# Patient Record
Sex: MALE | Race: OTHER | NOT HISPANIC OR LATINO | URBAN - METROPOLITAN AREA
[De-identification: names, ages, dates, MRNs, and addresses within clinical notes are randomized per-mention and may not be internally consistent; named-entity substitution may affect disease eponyms.]

---

## 2018-07-21 ENCOUNTER — EMERGENCY (EMERGENCY)
Facility: HOSPITAL | Age: 38
LOS: 0 days | Discharge: HOME | End: 2018-07-22
Admitting: PHYSICIAN ASSISTANT

## 2018-07-21 VITALS
HEART RATE: 66 BPM | RESPIRATION RATE: 18 BRPM | OXYGEN SATURATION: 100 % | SYSTOLIC BLOOD PRESSURE: 149 MMHG | DIASTOLIC BLOOD PRESSURE: 88 MMHG | TEMPERATURE: 98 F

## 2018-07-21 VITALS — WEIGHT: 191.8 LBS | HEIGHT: 70 IN

## 2018-07-21 DIAGNOSIS — S61.210A LACERATION WITHOUT FOREIGN BODY OF RIGHT INDEX FINGER WITHOUT DAMAGE TO NAIL, INITIAL ENCOUNTER: ICD-10-CM
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
## 2018-07-21 DIAGNOSIS — Z23 ENCOUNTER FOR IMMUNIZATION: ICD-10-CM

## 2018-07-21 DIAGNOSIS — S61.200A UNSPECIFIED OPEN WOUND OF RIGHT INDEX FINGER WITHOUT DAMAGE TO NAIL, INITIAL ENCOUNTER: ICD-10-CM
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
## 2018-07-21 RX ORDER — TETANUS TOXOID, REDUCED DIPHTHERIA TOXOID AND ACELLULAR PERTUSSIS VACCINE, ADSORBED 5; 2.5; 8; 8; 2.5 [IU]/.5ML; [IU]/.5ML; UG/.5ML; UG/.5ML; UG/.5ML
0.5 SUSPENSION INTRAMUSCULAR ONCE
Qty: 0 | Refills: 0 | Status: COMPLETED | OUTPATIENT
Start: 2018-07-21 | End: 2018-07-21

## 2018-07-21 RX ADMIN — TETANUS TOXOID, REDUCED DIPHTHERIA TOXOID AND ACELLULAR PERTUSSIS VACCINE, ADSORBED 0.5 MILLILITER(S): 5; 2.5; 8; 8; 2.5 SUSPENSION INTRAMUSCULAR at 23:55

## 2018-07-22 NOTE — ED PROVIDER NOTE - OBJECTIVE STATEMENT
lac/avulsion to right 2nd digit 2 hrs ago  pt was contemplating coming in or not because skin hanging, scared to cut it off himself  cut with blade, was putting back in his toolbox and forgot to cap it  no other sxs  not utd with tdap

## 2022-06-28 NOTE — ED PROCEDURE NOTE - CPROC ED POST PROC CARE GUIDE1
Removed intact unable to pass to target lesion Keep the cast/splint/dressing clean and dry./Instructed patient/caregiver to follow-up with primary care physician./Verbal/written post procedure instructions were given to patient/caregiver./Instructed patient/caregiver regarding signs and symptoms of infection.

## 2023-06-16 ENCOUNTER — EMERGENCY (EMERGENCY)
Facility: HOSPITAL | Age: 43
LOS: 0 days | Discharge: HOME | End: 2023-06-16
Attending: EMERGENCY MEDICINE
Payer: COMMERCIAL

## 2023-06-16 VITALS
SYSTOLIC BLOOD PRESSURE: 132 MMHG | HEART RATE: 59 BPM | WEIGHT: 184.97 LBS | TEMPERATURE: 98 F | RESPIRATION RATE: 18 BRPM | DIASTOLIC BLOOD PRESSURE: 92 MMHG | OXYGEN SATURATION: 100 %

## 2023-06-16 VITALS
RESPIRATION RATE: 18 BRPM | OXYGEN SATURATION: 100 % | SYSTOLIC BLOOD PRESSURE: 121 MMHG | DIASTOLIC BLOOD PRESSURE: 75 MMHG | HEART RATE: 63 BPM | TEMPERATURE: 98 F

## 2023-06-16 DIAGNOSIS — Z85.72 PERSONAL HISTORY OF NON-HODGKIN LYMPHOMAS: ICD-10-CM

## 2023-06-16 DIAGNOSIS — H57.89 OTHER SPECIFIED DISORDERS OF EYE AND ADNEXA: ICD-10-CM

## 2023-06-16 DIAGNOSIS — I10 ESSENTIAL (PRIMARY) HYPERTENSION: ICD-10-CM
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
## 2023-06-16 DIAGNOSIS — H53.8 OTHER VISUAL DISTURBANCES: ICD-10-CM

## 2023-06-16 PROCEDURE — 99283 EMERGENCY DEPT VISIT LOW MDM: CPT

## 2023-06-16 RX ORDER — BACITRACIN ZINC AND POLYMYXIN B SULFATE 500; 10000 [USP'U]/G; [USP'U]/G
1 OINTMENT OPHTHALMIC ONCE
Refills: 0 | Status: DISCONTINUED | OUTPATIENT
Start: 2023-06-16 | End: 2023-06-16

## 2023-06-16 RX ORDER — ERYTHROMYCIN BASE 5 MG/GRAM
1 OINTMENT (GRAM) OPHTHALMIC (EYE) ONCE
Refills: 0 | Status: COMPLETED | OUTPATIENT
Start: 2023-06-16 | End: 2023-06-16

## 2023-06-16 RX ORDER — OFLOXACIN 0.3 %
1 DROPS OPHTHALMIC (EYE)
Qty: 1 | Refills: 0
Start: 2023-06-16 | End: 2023-06-22

## 2023-06-16 RX ADMIN — Medication 1 APPLICATION(S): at 22:10

## 2023-06-16 NOTE — ED PROVIDER NOTE - NSFOLLOWUPINSTRUCTIONS_ED_ALL_ED_FT
Use the bacitracin/polymyxin B ointment in the left eye and the ofloxacin eye drops 4 times per day in the left eye x 7 days.    Follow-up in the [facility] Ophthalmology clinic tomorrow.       Eye Foreign Body  A foreign body is an object on or in the eye that should not be there. This could be any object, such as:  A speck of dirt or dust.  A hair or eyelash.  A splinter.  A piece of metal, such as when a tiny piece of metal is thrown into the air while a person is working with certain tools.  If the object is on the outside of the eyeball, it can usually be washed out or taken out by your doctor. An object that is inside the eyeball needs emergency treatment right away.    What are the causes?  This condition is caused by an object hitting or entering the eye.    What are the signs or symptoms?  Common symptoms of this condition include:  Pain and irritation.  Feeling like something is in the eye.  Tears coming from the eye.  Redness.  Small "rust rings" around the object if it is metal.  Blurry vision.  If the object is inside the eyeball, it may cause:  A lot of pain.  Loss of vision right away or blurry vision.  A change in the shape of the black part of the eye (distortion of the pupil).  How is this treated?  Treatment for an object on the outside of the eyeball may include:  Having the object removed from your eye by your doctor. Your doctor may do this by washing the eye or using small instruments. If you have rust in your eye from a metal object, the doctor will also remove the rust.  Using eye drops that numb the eye to help with pain.  Using antibiotic drops or ointment if the object scratched your cornea. The cornea is the clear covering on the front of the eye.  Wearing a bandage (eye shield) over your eye.  If you have a foreign body inside your eyeball, you will need surgery right away.    You may need to see an eye specialist (ophthalmologist) for further treatment.    Follow these instructions at home:    Medicines    Take over-the-counter and prescription medicines only as told by your doctor. Use eye drops or ointment as told.  If you were prescribed antibiotic drops or ointment, use it as told by your doctor. Do not stop using it even if you start to feel better.  General instructions    If you have a bandage on your eye:  Wear it as told. Follow instructions from your doctor about when to take it off.  Do not drive or use machinery while wearing the bandage.  If you do not have a bandage on your eye:  Keep your eye closed as much as possible.  Do not rub your eye.  Do not wear contact lenses until your eye feels normal, or as told by your doctor.  Wear dark glasses in bright light.  If you are doing activities with a high risk of eye injury, wear protective eye covering. These activities include using high-speed tools.  Keep all follow-up visits.  Contact a doctor if:  You have more pain in your eye.  You have problems with your eye bandage.  You have abnormal fluid (discharge) coming from your eye.  Get help right away if:  Your ability to see (vision) gets worse.  You have more redness and swelling in or around your eye.  Summary  A foreign body is an object on or in the eye that should not be there.  An object on the outside of the eyeball can usually be washed out or taken out by your doctor. An object inside the eyeball is an emergency.  If you have a bandage on your eye (eye shield), do not drive while wearing it.  If you have more pain in your eye, contact your doctor. Our Emergency Department Referral Coordinators will be reaching out to you in the next 24-48 hours from 9:00am to 5:00pm with a follow up appointment. Please expect a phone call from the hospital in that time frame. If you do not receive a call or if you have any questions or concerns, you can reach them at   [phone].      Use the erythromycin ointment in the left eye and the ofloxacin eye drops 4 times per day in the left eye x 7 days.    Follow-up in the [facility] Ophthalmology clinic tomorrow.       Eye Foreign Body  A foreign body is an object on or in the eye that should not be there. This could be any object, such as:  A speck of dirt or dust.  A hair or eyelash.  A splinter.  A piece of metal, such as when a tiny piece of metal is thrown into the air while a person is working with certain tools.  If the object is on the outside of the eyeball, it can usually be washed out or taken out by your doctor. An object that is inside the eyeball needs emergency treatment right away.    What are the causes?  This condition is caused by an object hitting or entering the eye.    What are the signs or symptoms?  Common symptoms of this condition include:  Pain and irritation.  Feeling like something is in the eye.  Tears coming from the eye.  Redness.  Small "rust rings" around the object if it is metal.  Blurry vision.  If the object is inside the eyeball, it may cause:  A lot of pain.  Loss of vision right away or blurry vision.  A change in the shape of the black part of the eye (distortion of the pupil).  How is this treated?  Treatment for an object on the outside of the eyeball may include:  Having the object removed from your eye by your doctor. Your doctor may do this by washing the eye or using small instruments. If you have rust in your eye from a metal object, the doctor will also remove the rust.  Using eye drops that numb the eye to help with pain.  Using antibiotic drops or ointment if the object scratched your cornea. The cornea is the clear covering on the front of the eye.  Wearing a bandage (eye shield) over your eye.  If you have a foreign body inside your eyeball, you will need surgery right away.    You may need to see an eye specialist (ophthalmologist) for further treatment.    Follow these instructions at home:    Medicines    Take over-the-counter and prescription medicines only as told by your doctor. Use eye drops or ointment as told.  If you were prescribed antibiotic drops or ointment, use it as told by your doctor. Do not stop using it even if you start to feel better.  General instructions    If you have a bandage on your eye:  Wear it as told. Follow instructions from your doctor about when to take it off.  Do not drive or use machinery while wearing the bandage.  If you do not have a bandage on your eye:  Keep your eye closed as much as possible.  Do not rub your eye.  Do not wear contact lenses until your eye feels normal, or as told by your doctor.  Wear dark glasses in bright light.  If you are doing activities with a high risk of eye injury, wear protective eye covering. These activities include using high-speed tools.  Keep all follow-up visits.  Contact a doctor if:  You have more pain in your eye.  You have problems with your eye bandage.  You have abnormal fluid (discharge) coming from your eye.  Get help right away if:  Your ability to see (vision) gets worse.  You have more redness and swelling in or around your eye.  Summary  A foreign body is an object on or in the eye that should not be there.  An object on the outside of the eyeball can usually be washed out or taken out by your doctor. An object inside the eyeball is an emergency.  If you have a bandage on your eye (eye shield), do not drive while wearing it.  If you have more pain in your eye, contact your doctor.
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
## 2023-06-16 NOTE — ED PROVIDER NOTE - PROGRESS NOTE DETAILS
[id number] AY: Patient reporting nonsignificant relief after 1L through alejandro lens in left eye. Ophthalmology resident on call recommends follow-up in eye clinic tomorrow.     Explained to patient that he will have to follow-up with [facility] ophtho clinic tomorrow and would have to  eye drops from his pharmacy, and use polysporin ointment + ofloxacin drops 4 times per day x 7 days. Patient endorsed understanding. The patient was given detailed return precautions and advised to return to the emergency department if any new symptoms developed, symptoms worsened or for any concerns. The patient was offered the opportunity to ask questions and verbalized that they understand the diagnosis and discharge instructions. AY: Patient reporting nonsignificant relief after 1L through alejandro lens in left eye. Ophthalmology resident on call recommends follow-up in eye clinic tomorrow.     Explained to patient that he will have to follow-up with [facility] ophtho clinic tomorrow and would have to  eye drops from his pharmacy, and use erythromycin ointment + ofloxacin drops 4 times per day x 7 days. Patient endorsed understanding. The patient was given detailed return precautions and advised to return to the emergency department if any new symptoms developed, symptoms worsened or for any concerns. The patient was offered the opportunity to ask questions and verbalized that they understand the diagnosis and discharge instructions. Discussed with Dr. Price and followed recommendations.

## 2023-06-16 NOTE — ED ADULT NURSE NOTE - NSFALLUNIVINTERV_ED_ALL_ED
Bed/Stretcher in lowest position, wheels locked, appropriate side rails in place/Call bell, personal items and telephone in reach/Instruct patient to call for assistance before getting out of bed/chair/stretcher/Non-slip footwear applied when patient is off stretcher/[place] to call system/Physically safe environment - no spills, clutter or unnecessary equipment/Purposeful proactive rounding/Room/bathroom lighting operational, light cord in reach

## 2023-06-16 NOTE — ED PROVIDER NOTE - MDM PATIENT STATEMENT FOR ADDL TREATMENT
Patient with one or more new problems requiring additional work-up/treatment.
No. LUISA screening performed.  STOP BANG Legend: 0-2 = LOW Risk; 3-4 = INTERMEDIATE Risk; 5-8 = HIGH Risk

## 2023-06-16 NOTE — ED PROVIDER NOTE - PATIENT PORTAL LINK FT
You can access the FollowMyHealth Patient Portal offered by [facility] by registering at the following website: [email or web address]. By joining [facility]’s FollowMyHealth portal, you will also be able to view your health information using other applications (apps) compatible with our system.
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
## 2023-06-16 NOTE — ED PROVIDER NOTE - CLINICAL SUMMARY MEDICAL DECISION MAKING FREE TEXT BOX
Patient remained stable in ED. Consulted ophthalmology on call, as recommended, ophthalmic abx prescribed and patient was instructed in detail to follow up as outpatient for further care of his health.

## 2023-06-16 NOTE — ED PROVIDER NOTE - NS ED ATTENDING STATEMENT MOD
This was a shared visit with the VALENTE. I reviewed and verified the documentation and independently performed the documented:

## 2023-06-16 NOTE — ED PROVIDER NOTE - PHYSICAL EXAMINATION
Physical Exam    Vital Signs: I have reviewed the initial vital signs.  Constitutional: appears stated age, no acute distress  Eyes: Conjunctiva erythematous in left eye, PERRLA, EOMI, visual acuity 20/20 in right, 20/25 left eye. no gross fluorescein uptake bilaterally  Respiratory: unlabored respiratory effort  Musculoskeletal: supple neck  Integumentary: warm, dry, no rash  Neurologic: awake, alert, oriented x3  Psychiatric: appropriate mood, appropriate affect
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
## 2023-06-16 NOTE — ED PROVIDER NOTE - NSFOLLOWUPCLINICS_GEN_ALL_ED_FT
[facility]  Ophthalmolgy  [address], [address]  [place] [address]  Phone: [phone]  Fax:   Scheduled Appointment: 6/17/2023

## 2023-06-20 NOTE — CHART NOTE - NSCHARTNOTESELECT_GEN_ALL_CORE
nonclinical appt information/Event Note Calcipotriene Pregnancy And Lactation Text: This medication has not been proven safe during pregnancy. It is unknown if this medication is excreted in breast milk.